# Patient Record
Sex: MALE | Race: ASIAN | NOT HISPANIC OR LATINO
[De-identification: names, ages, dates, MRNs, and addresses within clinical notes are randomized per-mention and may not be internally consistent; named-entity substitution may affect disease eponyms.]

---
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
## 2021-12-20 ENCOUNTER — APPOINTMENT (OUTPATIENT)
Dept: PEDIATRIC CARDIOLOGY | Facility: CLINIC | Age: 21
End: 2021-12-20
Payer: COMMERCIAL

## 2021-12-20 VITALS
HEIGHT: 65.55 IN | DIASTOLIC BLOOD PRESSURE: 60 MMHG | WEIGHT: 143.96 LBS | SYSTOLIC BLOOD PRESSURE: 106 MMHG | OXYGEN SATURATION: 99 % | BODY MASS INDEX: 23.7 KG/M2 | HEART RATE: 73 BPM

## 2021-12-20 DIAGNOSIS — Q23.1 CONGENITAL INSUFFICIENCY OF AORTIC VALVE: ICD-10-CM

## 2021-12-20 DIAGNOSIS — Q24.4 CONGENITAL SUBAORTIC STENOSIS: ICD-10-CM

## 2021-12-20 DIAGNOSIS — Z00.00 ENCOUNTER FOR GENERAL ADULT MEDICAL EXAMINATION W/OUT ABNORMAL FINDINGS: ICD-10-CM

## 2021-12-20 PROCEDURE — 99214 OFFICE O/P EST MOD 30 MIN: CPT

## 2021-12-20 PROCEDURE — 93303 ECHO TRANSTHORACIC: CPT

## 2021-12-20 PROCEDURE — 93325 DOPPLER ECHO COLOR FLOW MAPG: CPT

## 2021-12-20 PROCEDURE — 93320 DOPPLER ECHO COMPLETE: CPT

## 2021-12-20 PROCEDURE — 93000 ELECTROCARDIOGRAM COMPLETE: CPT

## 2021-12-22 NOTE — CARDIOLOGY SUMMARY
[Today's Date] : [unfilled] [LVSF ___%] : LV Shortening Fraction [unfilled]% [Normal] : normal [FreeTextEntry1] : Electrocardiogram today shows a normal sinus rhythm at a rate of 71 bpm, with a normal axis, a right ventricular conduction delay, and normal ventricular forces. The measured intervals were normal. There was no ectopy seen on the surface electrocardiogram. [FreeTextEntry2] : A two-dimensional echocardiogram with Doppler evaluation revealed normal cardiac architecture. There is a nonobstructive, subaortic membrane noted. There is no subaortic stenosis, in that there is only acceleration of Doppler flow velocity across the left ventricular outflow of approximately <2 m/s. Mild aortic valve regurgitation is seen. Also, there is a very minimal, subtle fusion of the right and left coronary artery cusps, suggestive of a bicuspid aortic valve. The global systolic performance of both the right and left ventricles was normal. The left ventricular ejection fraction by the 5/6*A*L method was normal at 69%.

## 2021-12-22 NOTE — CONSULT LETTER
[Today's Date] : [unfilled] [Name] : Name: [unfilled] [] : : ~~ [Today's Date:] : [unfilled] [Dear  ___:] : Dear Dr. [unfilled]: [Consult] : I had the pleasure of evaluating your patient, [unfilled]. My full evaluation follows. [Consult - Single Provider] : Thank you very much for allowing me to participate in the care of this patient. If you have any questions, please do not hesitate to contact me. [Sincerely,] : Sincerely, [FreeTextEntry4] : Dr. Reyes Vargas [FreeTextEntry5] : [address] [facility] [FreeTextEntry6] : [place] [address] [FreeTextEntry7] : [phone] [de-identified] : Shawanda Lopez MD\par Pediatric Cardiologist\par [facility]\par [address] [place] [address]\par [place], [place] [address]\par Phone: [phone]\par FAX: [phone]\par

## 2021-12-22 NOTE — HISTORY OF PRESENT ILLNESS
[FreeTextEntry1] : Kelvin was evaluated at the cardiology office at the [facility] on December 20, 2021.  He is now a 21 year-old young man who is followed in our division with the diagnosis of a subaortic membrane, which is non-obstructive, and a subtle bicuspid aortic valve. His last evaluation was on December 17, 2019.\par \par He was accompanied to the office visit today by his mother.  Severo has received the COVID-19 vaccine as well as the booster.  He has also received this season's influenza vaccine.\par \par He remains asymptomatic with reference to the cardiovascular system. He denies complaints of chest pain, palpitations, dizziness or syncope. \par \par His review of systems is notable for reactive airway disease which is often triggered by exercise, cold weather and upper respiratory tract infections. He is treated with inhalers for this problem on an as-needed basis (very rarely).\par \par He is on no chronic medications and has seasonal/environmental allergies. He also has eczema.  He is cared for by a dermatologist and is treated Dupixent for his eczema. He is compliant with biannual visits to the dentist.  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  A review of systems was otherwise unremarkable.\par \par Family history is notable in that Kelvin's  sister, Serina, is followed in our division with underlying congenital heart disease, best characterized as a ventricular septal defect with complex right ventricular outflow obstruction which required open heart surgery.  There has been no significant interval cardiac family history.

## 2021-12-22 NOTE — REASON FOR VISIT
[Follow-Up] : a follow-up visit for [Mother] : mother [FreeTextEntry3] : nonobstructive subaortic membrane and mild aortic insufficiency

## 2021-12-22 NOTE — PHYSICAL EXAM
[Apical Impulse] : quiet precordium with normal apical impulse [Heart Rate And Rhythm] : normal heart rate and rhythm [Heart Sounds] : normal S1 and S2 [Heart Sounds Gallop] : no gallops [Heart Sounds Pericardial Friction Rub] : no pericardial rub [Heart Sounds Click] : no clicks [Arterial Pulses] : normal upper and lower extremity pulses with no pulse delay [Edema] : no edema [Capillary Refill Test] : normal capillary refill [No Diastolic Murmur] : no diastolic murmur was heard [Nail Clubbing] : no clubbing  or cyanosis of the fingernails [Cervical Lymph Nodes Enlarged Anterior] : The anterior cervical nodes were normal [General Appearance - Alert] : alert [General Appearance - In No Acute Distress] : in no acute distress [General Appearance - Well Nourished] : well nourished [General Appearance - Well Developed] : well developed [General Appearance - Well-Appearing] : well appearing [Attitude Uncooperative] : cooperative [Facies] : there were no dysmorphic facial features [Sclera] : the conjunctiva were normal [Examination Of The Oral Cavity] : mucous membranes were moist and pink [Oropharynx] : the oropharynx was normal [Respiration, Rhythm And Depth] : normal respiratory rhythm and effort [Auscultation Breath Sounds / Voice Sounds] : breath sounds clear to auscultation bilaterally [Chest Visual Inspection Thoracic Deformity] : no chest wall deformity [Chest Palpation Tender Sternum] : no chest wall tenderness [Abdomen Tenderness] : non-tender [] : no hepato-splenomegaly [Abnormal Walk] : normal gait [Demonstrated Behavior - Infant Nonreactive To Parents] : interactive [Mood] : mood and affect were appropriate for age [Demonstrated Behavior] : normal behavior [FreeTextEntry1] : wears corrective glasses

## 2021-12-22 NOTE — DISCUSSION/SUMMARY
[PE + No Restrictions] : [unfilled] may participate in the entire physical education program without restriction, including all varsity competitive sports. [Influenza vaccine is recommended] : Influenza vaccine is recommended [Needs SBE Prophylaxis] : [unfilled] does not need bacterial endocarditis prophylaxis [FreeTextEntry1] : In summary, Kelvin continues to have clinical and laboratory data consistent with a nonobstructive, subaortic membrane and mild aortic insufficiency.  There has been no interval change since his last evaluation in November 2018.  There is no indication for intervention at this time. It would be important to continue to follow Kelvin closely and expectantly over time for the potential worsening of left ventricular outflow obstruction and/or worsening aortic insufficiency. As noted in the above echo report, he has a very subtle bicuspid aortic valve. His aortic dimensions are well within normal limits and he has no evidence of an aortopathy. Continued cardiac surveillance is indicated.\par \par He should continue to follow with his pulmonologist/allergist for his reactive airway disease and allergies; and with his dermatologist for his significant eczema.  I emphasized the importance of excellent dental hygiene with biannual visits to the dentist for prophylactic cleanings.\par \par Kelvin should be seen in pediatric cardiology followup in approximately one years time, or sooner if clinically indicated. I hope you find this information helpful to you.

## 2025-02-22 ENCOUNTER — EMERGENCY (EMERGENCY)
Facility: HOSPITAL | Age: 25
LOS: 1 days | Discharge: HOME | End: 2025-02-22
Attending: EMERGENCY MEDICINE | Admitting: EMERGENCY MEDICINE
Payer: COMMERCIAL

## 2025-02-22 VITALS
HEART RATE: 88 BPM | SYSTOLIC BLOOD PRESSURE: 115 MMHG | DIASTOLIC BLOOD PRESSURE: 78 MMHG | TEMPERATURE: 98 F | OXYGEN SATURATION: 100 % | RESPIRATION RATE: 18 BRPM

## 2025-02-22 LAB — ETHANOL SERPL-MCNC: 261 MG/DL — HIGH

## 2025-02-22 NOTE — ED ADULT TRIAGE NOTE - CHIEF COMPLAINT QUOTE
Pt bib EMS c/o etoh intoxication. PT arrives A&Ox4 and reports etoh use prior to arrival. No s/s of acute trauma or bleeding. EMS reports pt has unsteady gait.

## 2025-02-22 NOTE — ED PROVIDER NOTE - INTERNATIONAL TRAVEL
Please approve or deny this refill request. The order is pended. Thank you. LOV 10/5/2021    Patient has upcoming appt on 10/04/22-annual visit.      Next Visit Date:  Future Appointments   Date Time Provider Marvin Burton   8/25/2022  9:15 AM SILVER Gordillo - CNP [facility]   10/4/2022 12:15 PM Shaunna Gee MD [facility]
No

## 2025-02-22 NOTE — ED PROVIDER NOTE - CLINICAL SUMMARY MEDICAL DECISION MAKING FREE TEXT BOX
Patient is a 24-year-old male complaining of altered mental status brought in by EMS with alcohol intoxication.  No external signs of trauma.  Patient with unsteady gait and slurred speech and is a fall risk at the time of presentation.  Over the course of his ER visit patient became more alert, gait steady, oriented to person, place, time of day.

## 2025-02-22 NOTE — ED PROVIDER NOTE - NSFOLLOWUPINSTRUCTIONS_ED_ALL_ED_FT
Follow up with your primary care doctor or clinics listed below if you do not have a doctor  [address] [address]  To make an appointment, call [phone]  [facility]  Address: [address] [place] [id number]  Appointment Center: [phone] ([phone])      Abuse of Alcohol    WHAT YOU NEED TO KNOW:    What is alcohol abuse? Alcohol abuse means you drink more than the recommended daily or weekly limits. You may be drinking alcohol regularly or drinking large amounts in a short period of time (binge drinking). You continue to drink even though it causes legal, work, or relationship problems.    What do I need to know about recommended alcohol limits? One drink is defined as 12 ounces (oz) of beer, 5 oz of wine, or 1.5 oz of liquor such as whiskey.    Men 21 to 64 years should limit alcohol to 2 drinks a day. Do not have more than 4 drinks in 1 day or more than 14 in 1 week.    All women, and men 65 or older should limit alcohol to 1 drink in a day. Do not have more than 3 drinks in 1 day or more than 7 in 1 week. Do not drink alcohol if you are pregnant.  What are the signs and symptoms of alcohol abuse?    Loss of interest in activities, work, and school    Hiding alcohol, or drinking in private    Depression, or guilt about drinking    Constant thoughts about alcohol    Drinking in the morning to relieve the effects of a hangover    Not being able to control the amount you drink    Restlessness, or erratic and violent behavior  What health problems can alcohol abuse cause?    Cancer in your liver, pancreas, stomach, colon, kidney, or breast    Stroke or a heart attack    Liver, kidney, or lung disease    Blackouts, memory loss, brain damage, or dementia    Diabetes, immune system problems, or thiamine (vitamin B1) deficiency    Problems for you and your baby if you drink while pregnant  How is alcohol abuse treated? Treatment can help you understand the reasons you abuse alcohol. Counselors and therapists provide you with support and help you find ways to cope instead of drinking. You may need inpatient treatment to provide a controlled environment. You may need outpatient treatment after your inpatient treatment is complete.    Detoxification (detox) is a program used to flush alcohol from your body. During detox, medicines are given to help prevent withdrawal symptoms when you stop drinking alcohol.    Brief intervention therapy helps you think about your alcohol use differently. A healthcare provider helps you set goals to decrease the amount of alcohol you drink. Therapy may continue after you leave the hospital.    Vitamin supplements such as B1 may be needed. Alcohol can make it hard for your body to absorb enough vitamin B1. You may be given vitamin B1 if you have low levels. It is also given to prevent brain damage from alcohol use.  What can I do to manage my alcohol use?    Work with healthcare providers on goals to drink less. This can help prevent health problems. For example, you may start by planning your weekly alcohol use. It will be easier to have fewer drinks if you plan ahead.    Have food when you drink alcohol. Food will prevent alcohol from getting into your system too quickly. Eat before you have your first alcohol drink.    Time your drinks carefully. Have no more than 1 drink in an hour. Have a liquid such as water, coffee, or a soft drink between alcohol drinks.    Do not drive if you have had alcohol. Plan ahead so you have a safe ride home. Make sure someone who has not been drinking can help you get home safely. Plan to use a taxi or other ride service, if needed.    Do not drink alcohol if you are taking medicine. Alcohol is dangerous when you combine it with certain medicines, such as acetaminophen or blood pressure medicine. Talk to your healthcare provider about all the medicines you currently take.  Where can I find support and more information?    Alcoholics Anonymous  Web Address: http://www.aa.org  Substance Abuse and Mental Health Services Administration (SAMHSA)  [address]  [place] [address]  Web Address: http://www.samhsa.gov or https://dpt2.samhsa.gov/treatment/  Call your local emergency number (911 in the [place]), or have someone call if:    You have sudden chest pain or trouble breathing.    You want to harm yourself or others.    You have a seizure.  When should I seek care immediately?    You cannot stop vomiting or you vomit blood.    When should I call my doctor?    You have hallucinations (you see or hear things that are not real).    You have questions or concerns about your condition or care.

## 2025-02-22 NOTE — ED ADULT NURSE NOTE - OBJECTIVE STATEMENT
Pt BIBA for AMS. Pt is alert and awake but sleepy at time of assessment. Endorses EtOH use earlier today but denies drug use. Upon inspection, no deformities, active bleeding, or bruising present to head or neck. PERRL. Respirations equal and unlabored. Full PMH unable to be obtained due to medical condition.

## 2025-02-26 DIAGNOSIS — F10.129 ALCOHOL ABUSE WITH INTOXICATION, UNSPECIFIED: ICD-10-CM
